# Patient Record
(demographics unavailable — no encounter records)

---

## 2024-10-31 NOTE — PHYSICAL EXAM
[Chaperone Present] : A chaperone was present in the examining room during all aspects of the physical examination [[id number]] : A chaperone was present during the pelvic exam. [Appropriately responsive] : appropriately responsive [Alert] : alert [No Acute Distress] : no acute distress [Soft] : soft [Non-tender] : non-tender [Non-distended] : non-distended [Oriented x3] : oriented x3 [Examination Of The Breasts] : a normal appearance [No Discharge] : no discharge [No Masses] : no breast masses were palpable [Labia Majora] : normal [Labia Minora] : normal [Atrophy] : atrophy [Dry Mucosa] : dry mucosa [No Bleeding] : There was no active vaginal bleeding [Normal] : normal [Uterine Adnexae] : non-palpable

## 2024-10-31 NOTE — END OF VISIT
[FreeTextEntry3] : I, Marissa King solely acted as scribe for Dr. Aranza Martinez on 10/22/2024  All medical entries made by the Scribe were at my, Dr. Pettit, direction and personally dictated by me on 10/22/2024 . I have reviewed the chart and agree that the record accurately reflects my personal performance of the history, physical exam, assessment and plan. I have also personally directed, reviewed, and agreed with the chart.

## 2024-10-31 NOTE — PHYSICAL EXAM
[Chaperone Present] : A chaperone was present in the examining room during all aspects of the physical examination [[id number]] : A chaperone was present during the pelvic exam. [Appropriately responsive] : appropriately responsive [Alert] : alert [No Acute Distress] : no acute distress [Soft] : soft [Non-tender] : non-tender [Non-distended] : non-distended [Oriented x3] : oriented x3 [Examination Of The Breasts] : a normal appearance [No Discharge] : no discharge [No Masses] : no breast masses were palpable [Labia Majora] : normal [Labia Minora] : normal [Atrophy] : atrophy [Dry Mucosa] : dry mucosa [No Bleeding] : There was no active vaginal bleeding [Normal] : normal [Uterine Adnexae] : non-palpable

## 2024-10-31 NOTE — HISTORY OF PRESENT ILLNESS
[Menarche Age: ____] : age at menarche was [unfilled] [Patient reported mammogram was normal] : Patient reported mammogram was normal [Patient reported breast sonogram was normal] : Patient reported breast sonogram was normal [Patient reported bone density results were abnormal] : Patient reported bone density results were abnormal [HPV test offered] : HPV test offered [N] : Patient denies prior pregnancies [Previously active] : previously active [Men] : men [Mammogramdate] : 11/2023 [TextBox_19] : @[facility] [BreastSonogramDate] : 11/2023 [TextBox_25] : @[facility] [PapSmeardate] : 10/18/2023 [TextBox_31] : Negative [BoneDensityDate] : 2018 [TextBox_37] : Osteoporosis  [ColonoscopyDate] : 05/2024 [TextBox_43] : Polyps, Pt was told to repeat in 3 years  [HPVDate] : 10/18/2023 [TextBox_78] : egative [LMPDate] : 2012 [PGHxTotal] : 0 [FreeTextEntry1] : 2012

## 2024-10-31 NOTE — PLAN
[FreeTextEntry1] : She is up to date with colon cancer screening.   Pap deferred until 2026 per [facility] guidelines.  Prescription for mammogram screening given. Self-breast exam reviewed.  Prescription for DEXA studies were given secondary to a history of osteoporosis.   She will follow up annually and as needed.